# Patient Record
Sex: FEMALE | Race: WHITE | NOT HISPANIC OR LATINO | Employment: OTHER | URBAN - METROPOLITAN AREA
[De-identification: names, ages, dates, MRNs, and addresses within clinical notes are randomized per-mention and may not be internally consistent; named-entity substitution may affect disease eponyms.]

---

## 2024-06-24 ENCOUNTER — APPOINTMENT (EMERGENCY)
Dept: RADIOLOGY | Facility: HOSPITAL | Age: 74
End: 2024-06-24
Payer: MEDICARE

## 2024-06-24 ENCOUNTER — HOSPITAL ENCOUNTER (EMERGENCY)
Facility: HOSPITAL | Age: 74
Discharge: HOME | End: 2024-06-24
Attending: EMERGENCY MEDICINE
Payer: MEDICARE

## 2024-06-24 VITALS
SYSTOLIC BLOOD PRESSURE: 140 MMHG | TEMPERATURE: 97.6 F | DIASTOLIC BLOOD PRESSURE: 74 MMHG | OXYGEN SATURATION: 98 % | HEART RATE: 80 BPM | RESPIRATION RATE: 19 BRPM

## 2024-06-24 DIAGNOSIS — S80.11XA CONTUSION OF RIGHT LOWER LEG, INITIAL ENCOUNTER: ICD-10-CM
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
## 2024-06-24 DIAGNOSIS — S20.20XA CONTUSION OF THORACIC WALL, INITIAL ENCOUNTER: ICD-10-CM

## 2024-06-24 DIAGNOSIS — S43.101A AC SEPARATION, RIGHT, INITIAL ENCOUNTER: ICD-10-CM

## 2024-06-24 LAB
AMORPH URATE CRY URNS QL MICRO: ABNORMAL
ANION GAP SERPL CALCULATED.3IONS-SCNC: 7 MMOL/L (ref 4–13)
APTT PPP: 27 SECONDS (ref 23–37)
BACTERIA UR QL AUTO: ABNORMAL /HPF
BASOPHILS # BLD AUTO: 0.04 THOUSANDS/ÂΜL (ref 0–0.1)
BASOPHILS NFR BLD AUTO: 0 % (ref 0–1)
BILIRUB UR QL STRIP: NEGATIVE
BUN SERPL-MCNC: 27 MG/DL (ref 5–25)
CALCIUM SERPL-MCNC: 10.1 MG/DL (ref 8.4–10.2)
CAOX CRY URNS QL MICRO: ABNORMAL /HPF
CHLORIDE SERPL-SCNC: 102 MMOL/L (ref 96–108)
CLARITY UR: ABNORMAL
CO2 SERPL-SCNC: 27 MMOL/L (ref 21–32)
COLOR UR: YELLOW
COLOR, POC: NORMAL
CREAT SERPL-MCNC: 1.51 MG/DL (ref 0.6–1.3)
EOSINOPHIL # BLD AUTO: 0.13 THOUSAND/ÂΜL (ref 0–0.61)
EOSINOPHIL NFR BLD AUTO: 1 % (ref 0–6)
ERYTHROCYTE [DISTWIDTH] IN BLOOD BY AUTOMATED COUNT: 13.7 % (ref 11.6–15.1)
GFR SERPL CREATININE-BSD FRML MDRD: 34 ML/MIN/1.73SQ M
GLUCOSE SERPL-MCNC: 101 MG/DL (ref 65–140)
GLUCOSE UR STRIP-MCNC: NEGATIVE MG/DL
HCT VFR BLD AUTO: 37.2 % (ref 34.8–46.1)
HGB BLD-MCNC: 11.9 G/DL (ref 11.5–15.4)
HGB UR QL STRIP.AUTO: NEGATIVE
IMM GRANULOCYTES # BLD AUTO: 0.11 THOUSAND/UL (ref 0–0.2)
IMM GRANULOCYTES NFR BLD AUTO: 1 % (ref 0–2)
INR PPP: 0.95 (ref 0.84–1.19)
KETONES UR STRIP-MCNC: NEGATIVE MG/DL
LEUKOCYTE ESTERASE UR QL STRIP: ABNORMAL
LYMPHOCYTES # BLD AUTO: 1.35 THOUSANDS/ÂΜL (ref 0.6–4.47)
LYMPHOCYTES NFR BLD AUTO: 14 % (ref 14–44)
MCH RBC QN AUTO: 31.2 PG (ref 26.8–34.3)
MCHC RBC AUTO-ENTMCNC: 32 G/DL (ref 31.4–37.4)
MCV RBC AUTO: 97 FL (ref 82–98)
MONOCYTES # BLD AUTO: 0.88 THOUSAND/ÂΜL (ref 0.17–1.22)
MONOCYTES NFR BLD AUTO: 9 % (ref 4–12)
NEUTROPHILS # BLD AUTO: 6.91 THOUSANDS/ÂΜL (ref 1.85–7.62)
NEUTS SEG NFR BLD AUTO: 75 % (ref 43–75)
NITRITE UR QL STRIP: NEGATIVE
NON-SQ EPI CELLS URNS QL MICRO: ABNORMAL /HPF
NRBC BLD AUTO-RTO: 0 /100 WBCS
PH UR STRIP.AUTO: 7.5 [PH] (ref 4.5–8)
PLATELET # BLD AUTO: 341 THOUSANDS/UL (ref 149–390)
PMV BLD AUTO: 9.1 FL (ref 8.9–12.7)
POTASSIUM SERPL-SCNC: 4.1 MMOL/L (ref 3.5–5.3)
PROT UR STRIP-MCNC: NEGATIVE MG/DL
PROTHROMBIN TIME: 13.1 SECONDS (ref 11.6–14.5)
RBC # BLD AUTO: 3.82 MILLION/UL (ref 3.81–5.12)
RBC #/AREA URNS AUTO: ABNORMAL /HPF
SODIUM SERPL-SCNC: 136 MMOL/L (ref 135–147)
SP GR UR STRIP.AUTO: 1.02 (ref 1–1.03)
UROBILINOGEN UR QL STRIP.AUTO: 1 E.U./DL
WBC # BLD AUTO: 9.42 THOUSAND/UL (ref 4.31–10.16)
WBC #/AREA URNS AUTO: ABNORMAL /HPF

## 2024-06-24 PROCEDURE — 99285 EMERGENCY DEPT VISIT HI MDM: CPT | Performed by: EMERGENCY MEDICINE

## 2024-06-24 PROCEDURE — 73030 X-RAY EXAM OF SHOULDER: CPT

## 2024-06-24 PROCEDURE — 93005 ELECTROCARDIOGRAM TRACING: CPT

## 2024-06-24 PROCEDURE — 85730 THROMBOPLASTIN TIME PARTIAL: CPT | Performed by: EMERGENCY MEDICINE

## 2024-06-24 PROCEDURE — 80048 BASIC METABOLIC PNL TOTAL CA: CPT | Performed by: EMERGENCY MEDICINE

## 2024-06-24 PROCEDURE — 71101 X-RAY EXAM UNILAT RIBS/CHEST: CPT

## 2024-06-24 PROCEDURE — 73590 X-RAY EXAM OF LOWER LEG: CPT

## 2024-06-24 PROCEDURE — 36415 COLL VENOUS BLD VENIPUNCTURE: CPT | Performed by: EMERGENCY MEDICINE

## 2024-06-24 PROCEDURE — 81001 URINALYSIS AUTO W/SCOPE: CPT

## 2024-06-24 PROCEDURE — 85025 COMPLETE CBC W/AUTO DIFF WBC: CPT | Performed by: EMERGENCY MEDICINE

## 2024-06-24 PROCEDURE — 85610 PROTHROMBIN TIME: CPT | Performed by: EMERGENCY MEDICINE

## 2024-06-24 PROCEDURE — 99284 EMERGENCY DEPT VISIT MOD MDM: CPT

## 2024-06-24 RX ORDER — ACETAMINOPHEN 325 MG/1
650 TABLET ORAL ONCE
Status: COMPLETED | OUTPATIENT
Start: 2024-06-24 | End: 2024-06-24

## 2024-06-24 RX ADMIN — ACETAMINOPHEN 650 MG: 325 TABLET, FILM COATED ORAL at 11:47

## 2024-06-24 NOTE — ED PROVIDER NOTES
History  Chief Complaint   Patient presents with    Fall     Pt reports fell yesterday ;anding on right knee and shoulder. Pt reports sx on shoulder in October '23. Pt reports pain to right shoulder. -thinners -LOC     73-year-old female presents for the evaluation of injury sustained after 2 recent falls.  The patient states that she tripped in her camper and then tripped at the [facility].  The patient injured her right shoulder which she had a replacement last year.  The patient also has an injury to her right lower leg with bruising.  The patient did notice some bruising on her back however does not have any pain with movement or deep inspiration.  She states just hurts a little when they hit bumps in the car.  The patient denies syncope.  She states that she was a little lightheaded however it was very hot this past weekend.  Patient denies history of ambulatory dysfunction or repeated falls.  The patient denies any associated new numbness or tingling.  The patient denies any blood thinning or antiplatelet medication.      Fall      None       History reviewed. No pertinent past medical history.    History reviewed. No pertinent surgical history.    History reviewed. No pertinent family history.  I have reviewed and agree with the history as documented.    E-Cigarette/Vaping     E-Cigarette/Vaping Substances     Social History     Tobacco Use    Smoking status: Never    Smokeless tobacco: Never   Substance Use Topics    Alcohol use: Not Currently    Drug use: Not Currently       Review of Systems    Physical Exam  Physical Exam  Vitals and nursing note reviewed.   Constitutional:       General: She is not in acute distress.     Appearance: She is well-developed.   HENT:      Head: Normocephalic and atraumatic.      Right Ear: External ear normal.      Left Ear: External ear normal.   Eyes:      General: No scleral icterus.     Conjunctiva/sclera: Conjunctivae normal.      Pupils: Pupils are equal,  round, and reactive to light.   Cardiovascular:      Rate and Rhythm: Normal rate and regular rhythm.      Heart sounds: Normal heart sounds.   Pulmonary:      Effort: Pulmonary effort is normal. No respiratory distress.      Breath sounds: Normal breath sounds.   Abdominal:      General: Bowel sounds are normal.      Palpations: Abdomen is soft.      Tenderness: There is no abdominal tenderness. There is no guarding or rebound.   Musculoskeletal:      Right shoulder: No swelling or deformity. Decreased range of motion. Normal pulse.      Cervical back: Normal range of motion.      Thoracic back: Signs of trauma (Bruising along the right lower thoracic region.) present.      Right lower leg: Tenderness (With bruising to the medial aspect of the right tibial region) present.   Skin:     General: Skin is warm and dry.      Findings: No rash.   Neurological:      Mental Status: She is alert and oriented to person, place, and time.         Vital Signs  ED Triage Vitals   Temperature Pulse Respirations Blood Pressure SpO2   06/24/24 1021 06/24/24 1020 06/24/24 1020 06/24/24 1020 06/24/24 1020   97.6 °F (36.4 °C) 86 16 (!) 133/101 98 %      Temp Source Heart Rate Source Patient Position - Orthostatic VS BP Location FiO2 (%)   06/24/24 1021 06/24/24 1020 06/24/24 1020 06/24/24 1020 --   Temporal Monitor Sitting Left arm       Pain Score       06/24/24 1147       8           Vitals:    06/24/24 1020 06/24/24 1145   BP: (!) 133/101 140/74   Pulse: 86 80   Patient Position - Orthostatic VS: Sitting Sitting         Visual Acuity  Visual Acuity      Flowsheet Row Most Recent Value   L Pupil Size (mm) 3   R Pupil Size (mm) 3            ED Medications  Medications   acetaminophen (TYLENOL) tablet 650 mg (650 mg Oral Given 6/24/24 1147)       Diagnostic Studies  Results Reviewed       Procedure Component Value Units Date/Time    Protime-INR [[id number]]  (Normal) Collected: 06/24/24 1146    Lab Status: Final result Specimen: Blood  from Arm, Right Updated: 06/24/24 1259     Protime 13.1 seconds      INR 0.95    APTT [[id number]]  (Normal) Collected: 06/24/24 1146    Lab Status: Final result Specimen: Blood from Arm, Right Updated: 06/24/24 1259     PTT 27 seconds     Urine Microscopic [[id number]]  (Abnormal) Collected: 06/24/24 1200    Lab Status: Final result Specimen: Urine, Clean Catch Updated: 06/24/24 1237     RBC, UA None Seen /hpf      WBC, UA 2-4 /hpf      Epithelial Cells Moderate /hpf      Bacteria, UA Occasional /hpf      Amorphous Crystals, UA Moderate     Ca Oxalate Monika, UA Occasional /hpf     Basic metabolic panel [[id number]]  (Abnormal) Collected: 06/24/24 1146    Lab Status: Final result Specimen: Blood from Arm, Right Updated: 06/24/24 1209     Sodium 136 mmol/L      Potassium 4.1 mmol/L      Chloride 102 mmol/L      CO2 27 mmol/L      ANION GAP 7 mmol/L      BUN 27 mg/dL      Creatinine 1.51 mg/dL      Glucose 101 mg/dL      Calcium 10.1 mg/dL      eGFR 34 ml/min/1.73sq m     Narrative:      National Kid[employer] Disease Foundation guidelines for Chronic Kidney Disease (CKD):     Stage 1 with normal or high GFR (GFR > 90 mL/min/1.73 square meters)    Stage 2 Mild CKD (GFR = 60-89 mL/min/1.73 square meters)    Stage 3A Moderate CKD (GFR = 45-59 mL/min/1.73 square meters)    Stage 3B Moderate CKD (GFR = 30-44 mL/min/1.73 square meters)    Stage 4 Severe CKD (GFR = 15-29 mL/min/1.73 square meters)    Stage 5 End Stage CKD (GFR <15 mL/min/1.73 square meters)  Note: GFR calculation is accurate only with a steady state creatinine    POCT urinalysis dipstick [[id number]]  (Normal) Resulted: 06/24/24 1203    Lab Status: Final result Specimen: Urine Updated: 06/24/24 1203     Color, UA Light Yellow     Clarity, UA --     EXT Leukocytes, UA --     Nitrite, UA --     Protein, UA -- mg/dl      Glucose, UA --     Ketones, UA -- mg/dl      EXT Urobilinogen, UA --      Bilirubin, UA --     Blood, UA --    Urine Macroscopic, POC [[id number]]   (Abnormal) Collected: 06/24/24 1200    Lab Status: Final result Specimen: Urine Updated: 06/24/24 1201     Color, UA Yellow     Clarity, UA Slightly Cloudy     pH, UA 7.5     Leukocytes, UA Trace     Nitrite, UA Negative     Protein, UA Negative mg/dl      Glucose, UA Negative mg/dl      Ketones, UA Negative mg/dl      Urobilinogen, UA 1.0 E.U./dl      Bilirubin, UA Negative     Occult Blood, UA Negative     Specific Gravity, UA 1.020    CBC and differential [[id number]] Collected: 06/24/24 1146    Lab Status: Final result Specimen: Blood from Arm, Right Updated: 06/24/24 1155     WBC 9.42 Thousand/uL      RBC 3.82 Million/uL      Hemoglobin 11.9 g/dL      Hematocrit 37.2 %      MCV 97 fL      MCH 31.2 pg      MCHC 32.0 g/dL      RDW 13.7 %      MPV 9.1 fL      Platelets 341 Thousands/uL      nRBC 0 /100 WBCs      Segmented % 75 %      Immature Grans % 1 %      Lymphocytes % 14 %      Monocytes % 9 %      Eosinophils Relative 1 %      Basophils Relative 0 %      Absolute Neutrophils 6.91 Thousands/µL      Absolute Immature Grans 0.11 Thousand/uL      Absolute Lymphocytes 1.35 Thousands/µL      Absolute Monocytes 0.88 Thousand/µL      Eosinophils Absolute 0.13 Thousand/µL      Basophils Absolute 0.04 Thousands/µL                    XR tibia fibula 2 views RIGHT   ED Interpretation by Magnus Olivas DO (06/24 1145)   No acute osseous abnormality      Final Result by Idania Sage MD (06/24 1444)      No acute osseous abnormality.            Workstation performed: [id number]         XR shoulder 2+ views RIGHT   ED Interpretation by Magnus Olvias DO (06/24 1147)   Abnormal   Hardware appears intact.  AC joint space appears large      Final Result by Idania Sage MD (06/24 0846)      Right reverse shoulder arthroplasty with widening of the acromioclavicular joint and lucency at the level of the acromion which could reflect indeterminate age trauma.   Correlate with outside studies which are not  available for direct comparison and consider repeat CT shoulder if needed.   The study was marked in EPIC for immediate notification.         Workstation performed: [id number]         XR ribs with pa chest min 3 views RIGHT   ED Interpretation by Magnus Olivas DO (06/24 1145)   No acute osseous abnormality, no pneumothorax, no acute cardiopulmonary pathology      Final Result by Dane Olvera MD (06/24 1430)      No evidence of a rib fracture.      No acute cardiopulmonary disease.      Workstation performed: [id number]                    Procedures  ECG 12 Lead Documentation Only    Date/Time: 6/24/2024 11:48 AM    Performed by: Magnus Olivas DO  Authorized by: Magnus Olivas DO    Indications / Diagnosis:  Lightheadedness  ECG reviewed by me, the ED Provider: yes    Patient location:  ED  Previous ECG:     Previous ECG:  Unavailable  Interpretation:     Interpretation: non-specific    Rate:     ECG rate:  82    ECG rate assessment: normal    Rhythm:     Rhythm: sinus rhythm    Ectopy:     Ectopy: none    QRS:     QRS axis:  Indeterminate    QRS intervals:  Normal  Conduction:     Conduction: normal    ST segments:     ST segments:  Non-specific  T waves:     T waves: normal             ED Course  ED Course as of 06/24/24 1607   Mon Jun 24, 2024   1158 Discussed my interpretation of the x-ray of the shoulder with the patient concern for shoulder separation.  Patient already has a sling which she is going to wear and she is already been in contact with her orthopedist in [place].  Advised patient to follow-up with her orthopedist as soon as possible.                               SBIRT 22yo+      Flowsheet Row Most Recent Value   Initial Alcohol Screen: US AUDIT-C     1. How often do you have a drink containing alcohol? 0 Filed at: 06/24/2024 1104   2. How many drinks containing alcohol do you have on a typical day you are drinking?  0 Filed at: 06/24/2024 1104   3a. Male UNDER 65: How often do you  have five or more drinks on one occasion? 0 Filed at: 06/24/2024 1104   3b. FEMALE Any Age, or MALE 65+: How often do you have 4 or more drinks on one occassion? 0 Filed at: 06/24/2024 1104   Audit-C Score 0 Filed at: 06/24/2024 1104   GARRY: How many times in the past year have you...    Used an illegal drug or used a prescription medication for non-medical reasons? Never Filed at: 06/24/2024 1104                      Medical Decision Making  Differential diagnosis: Shoulder dislocation, AC separation, fracture/displacement of her shoulder hardware, lower extremity fracture, contusion, rib fracture, anemia, dehydration    The patient (and any family present) verbalized understanding of the discharge instructions and warnings that would necessitate return to the Emergency Department.    All questions were answered prior to discharge.    Amount and/or Complexity of Data Reviewed  Labs: ordered.  Radiology: ordered and independent interpretation performed.    Risk  OTC drugs.             Disposition  Final diagnoses:   Fall, initial encounter   AC separation, right, initial encounter   Contusion of right lower leg, initial encounter   Contusion of thoracic wall, initial encounter     Time reflects when diagnosis was documented in both MDM as applicable and the Disposition within this note       Time User Action Codes Description Comment    6/24/2024 12:34 PM Magnus Olivas Add [W19.XXXA] Fall, initial encounter     6/24/2024 12:34 PM Magnus Olivas Add [S43.101A] AC separation, right, initial encounter     6/24/2024 12:35 PM Magnus Olivas Add [S80.11XA] Contusion of right lower leg, initial encounter     6/24/2024 12:35 PM Magnus Olivas Add [S20.20XA] Contusion of thoracic wall, initial encounter           ED Disposition       ED Disposition   Discharge    Condition   Stable    Date/Time   Mon Jun 24, 2024 1234    Comment   Keshia Rodriguez discharge to home/self care.                   Follow-up Information     None         There are no discharge medications for this patient.      No discharge procedures on file.    PDMP Review       None            ED Provider  Electronically Signed by             Magnus Olivas DO  06/24/24 9016

## 2024-06-30 LAB
ATRIAL RATE: 82 BPM
ATRIAL RATE: 82 BPM
P AXIS: 63 DEGREES
P AXIS: 64 DEGREES
PR INTERVAL: 164 MS
PR INTERVAL: 176 MS
QRS AXIS: 0 DEGREES
QRS AXIS: 130 DEGREES
QRSD INTERVAL: 76 MS
QRSD INTERVAL: 84 MS
QT INTERVAL: 340 MS
QT INTERVAL: 360 MS
QTC INTERVAL: 397 MS
QTC INTERVAL: 420 MS
T WAVE AXIS: 87 DEGREES
T WAVE AXIS: 98 DEGREES
VENTRICULAR RATE: 82 BPM
VENTRICULAR RATE: 82 BPM

## 2024-06-30 PROCEDURE — 93010 ELECTROCARDIOGRAM REPORT: CPT | Performed by: INTERNAL MEDICINE

## 2024-08-12 ENCOUNTER — HOSPITAL ENCOUNTER (OUTPATIENT)
Dept: RADIOLOGY | Facility: CLINIC | Age: 74
Discharge: HOME | End: 2024-08-12
Payer: MEDICARE

## 2024-08-12 DIAGNOSIS — R93.89 ABNORMAL FINDINGS ON DIAGNOSTIC IMAGING OF OTHER SPECIFIED BODY STRUCTURES: ICD-10-CM

## 2024-08-12 PROCEDURE — 75571 CT HRT W/O DYE W/CA TEST: CPT
